# Patient Record
Sex: MALE | Race: WHITE | Employment: FULL TIME | URBAN - METROPOLITAN AREA
[De-identification: names, ages, dates, MRNs, and addresses within clinical notes are randomized per-mention and may not be internally consistent; named-entity substitution may affect disease eponyms.]

---

## 2017-06-14 ENCOUNTER — OFFICE VISIT (OUTPATIENT)
Dept: FAMILY MEDICINE CLINIC | Facility: CLINIC | Age: 32
End: 2017-06-14

## 2017-06-14 VITALS
WEIGHT: 167 LBS | RESPIRATION RATE: 16 BRPM | HEART RATE: 65 BPM | BODY MASS INDEX: 23.38 KG/M2 | OXYGEN SATURATION: 97 % | HEIGHT: 71 IN | SYSTOLIC BLOOD PRESSURE: 110 MMHG | DIASTOLIC BLOOD PRESSURE: 62 MMHG | TEMPERATURE: 98 F

## 2017-06-14 DIAGNOSIS — R09.81 CHRONIC NASAL CONGESTION: ICD-10-CM

## 2017-06-14 DIAGNOSIS — Z00.00 ANNUAL PHYSICAL EXAM: Primary | ICD-10-CM

## 2017-06-14 DIAGNOSIS — J34.2 DEVIATED NASAL SEPTUM: ICD-10-CM

## 2017-06-14 PROCEDURE — 99385 PREV VISIT NEW AGE 18-39: CPT | Performed by: FAMILY MEDICINE

## 2017-06-14 RX ORDER — RISPERIDONE 120 MG
KIT SUBCUTANEOUS
Refills: 1 | COMMUNITY
Start: 2017-06-05 | End: 2017-06-14

## 2017-06-14 RX ORDER — DEXTROAMPHETAMINE SACCHARATE, AMPHETAMINE ASPARTATE, DEXTROAMPHETAMINE SULFATE AND AMPHETAMINE SULFATE 5; 5; 5; 5 MG/1; MG/1; MG/1; MG/1
TABLET ORAL
Refills: 0 | COMMUNITY
Start: 2017-06-05 | End: 2017-06-14

## 2017-06-14 NOTE — PROGRESS NOTES
Angy Canales is a 34year old male who presents for a complete physical exam.   HPI:   Requesting referral to ENT  Re: deviated nasal septum  Nasal congestion for years  Can't breath to right nostril  Snores at night.     Has psychiatrist, Dr. 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    Exercise: minimal.  Diet: watches minimally     REVIEW OF patient indicates understanding of these issues and agrees to the plan. The patient is asked to return for CPX in 1 year.

## 2017-08-31 ENCOUNTER — APPOINTMENT (OUTPATIENT)
Dept: GENERAL RADIOLOGY | Age: 32
End: 2017-08-31
Attending: EMERGENCY MEDICINE
Payer: COMMERCIAL

## 2017-08-31 ENCOUNTER — HOSPITAL ENCOUNTER (OUTPATIENT)
Age: 32
Discharge: HOME | End: 2017-08-31
Attending: EMERGENCY MEDICINE
Payer: COMMERCIAL

## 2017-08-31 VITALS
DIASTOLIC BLOOD PRESSURE: 68 MMHG | TEMPERATURE: 98 F | RESPIRATION RATE: 16 BRPM | HEIGHT: 72 IN | SYSTOLIC BLOOD PRESSURE: 106 MMHG | OXYGEN SATURATION: 97 % | HEART RATE: 67 BPM | WEIGHT: 165 LBS | BODY MASS INDEX: 22.35 KG/M2

## 2017-08-31 DIAGNOSIS — S99.921A INJURY OF RIGHT FOOT, INITIAL ENCOUNTER: Primary | ICD-10-CM

## 2017-08-31 DIAGNOSIS — S93.601A SPRAIN OF RIGHT FOOT, INITIAL ENCOUNTER: ICD-10-CM

## 2017-08-31 PROCEDURE — 73630 X-RAY EXAM OF FOOT: CPT | Performed by: EMERGENCY MEDICINE

## 2017-08-31 PROCEDURE — 99203 OFFICE O/P NEW LOW 30 MIN: CPT

## 2017-08-31 PROCEDURE — 73610 X-RAY EXAM OF ANKLE: CPT | Performed by: EMERGENCY MEDICINE

## 2017-08-31 PROCEDURE — 99213 OFFICE O/P EST LOW 20 MIN: CPT

## 2017-08-31 RX ORDER — IBUPROFEN 400 MG/1
400 TABLET ORAL EVERY 6 HOURS PRN
COMMUNITY
End: 2017-09-25

## 2017-08-31 RX ORDER — ARM BRACE
EACH MISCELLANEOUS
Qty: 1 EACH | Refills: 0 | Status: SHIPPED | OUTPATIENT
Start: 2017-08-31

## 2017-08-31 NOTE — ED INITIAL ASSESSMENT (HPI)
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 \"I dropped a bottle of wine and when I went to step over it, I misjudged and felt felt a sharp pain in my right heel\"  Pain now radiates into right toes \"when I move my toes. \"  Limited ROM of ankle and t

## 2017-08-31 NOTE — ED PROVIDER NOTES
Patient presents with: Foot Injury    HPI:     Angy Canales is a 34year old male who presents with chief complaint of R foot pain. On 8/20 at 2130 pt stepped over a bottle of wine that he dropped and felt a sharp pain in the R heel.   Since then having p INDICATIONS:  right foot injury WC  PATIENT STATED HISTORY: (As transcribed by Technologist)  Patient states while at work he dropped a bottle of wine and he went to step over it and he felt a sharp pain to his right heel.  Patient states pain to base of ri

## 2017-09-25 ENCOUNTER — TELEPHONE (OUTPATIENT)
Dept: FAMILY MEDICINE CLINIC | Facility: CLINIC | Age: 32
End: 2017-09-25

## 2017-09-25 ENCOUNTER — OFFICE VISIT (OUTPATIENT)
Dept: FAMILY MEDICINE CLINIC | Facility: CLINIC | Age: 32
End: 2017-09-25

## 2017-09-25 VITALS
BODY MASS INDEX: 22.75 KG/M2 | HEART RATE: 74 BPM | RESPIRATION RATE: 16 BRPM | HEIGHT: 72 IN | DIASTOLIC BLOOD PRESSURE: 60 MMHG | TEMPERATURE: 98 F | WEIGHT: 168 LBS | SYSTOLIC BLOOD PRESSURE: 98 MMHG | OXYGEN SATURATION: 99 %

## 2017-09-25 DIAGNOSIS — Z11.3 SCREENING EXAMINATION FOR STD (SEXUALLY TRANSMITTED DISEASE): Primary | ICD-10-CM

## 2017-09-25 DIAGNOSIS — F41.9 ANXIETY: ICD-10-CM

## 2017-09-25 PROCEDURE — 87389 HIV-1 AG W/HIV-1&-2 AB AG IA: CPT | Performed by: FAMILY MEDICINE

## 2017-09-25 PROCEDURE — 87591 N.GONORRHOEAE DNA AMP PROB: CPT | Performed by: FAMILY MEDICINE

## 2017-09-25 PROCEDURE — 87491 CHLMYD TRACH DNA AMP PROBE: CPT | Performed by: FAMILY MEDICINE

## 2017-09-25 PROCEDURE — 99213 OFFICE O/P EST LOW 20 MIN: CPT | Performed by: FAMILY MEDICINE

## 2017-09-25 RX ORDER — DEXTROAMPHETAMINE SACCHARATE, AMPHETAMINE ASPARTATE, DEXTROAMPHETAMINE SULFATE AND AMPHETAMINE SULFATE 5; 5; 5; 5 MG/1; MG/1; MG/1; MG/1
TABLET ORAL
Refills: 0 | COMMUNITY
Start: 2017-09-08

## 2017-09-25 RX ORDER — RISPERIDONE 120 MG
KIT SUBCUTANEOUS
Refills: 1 | COMMUNITY
Start: 2017-08-03

## 2017-09-25 NOTE — TELEPHONE ENCOUNTER
Patient advised. Patient verbalized understanding.   Future Appointments  Date Time Provider Kashif Merino   9/25/2017 3:00 PM Oziel Salazar MD [facility]

## 2017-09-25 NOTE — TELEPHONE ENCOUNTER
Pt states he was not exposed to any STD that he is aware of. Patient denies any symptoms at this time. Patient would also like to get pertussis vaccines. Please advise if MD apt is needed or nurse apt.

## 2017-09-28 ENCOUNTER — TELEPHONE (OUTPATIENT)
Dept: FAMILY MEDICINE CLINIC | Facility: CLINIC | Age: 32
End: 2017-09-28

## 2017-09-28 NOTE — TELEPHONE ENCOUNTER
Patient wanted to confirm all tests were negative. Patient advised per lab result note. Patient verbalized understanding.

## (undated) NOTE — MR AVS SNAPSHOT
[address] [place] [id number] [phone]               Thank you for choosing us for your health care visit with Avis Davis MD.  We are glad to serve you and happy to provide you with this summary of your v schedule your appointment. If you are confident that your benefit plan will not require a referral or authorization, such as [place] Medicaid, please feel free to schedule your appointment immediately.  However, if you are unsure about the requirements What changed:  Another medication with the same name was removed. Continue taking this medication, and follow the directions you see here. * Notice: This list has 2 medication(s) that are the same as other medications prescribed for you.  Read th

## (undated) NOTE — LETTER
[facility] IN [place]  [address] [id number]  Dept: [phone]  Dept Fax: [phone]         August 31, 2017    Patient: Vannessa Gongora   YOB: 1985   Date of Visit: 8/31/2017       To Whom It May Concern:    Ketty Shen